# Patient Record
Sex: MALE | Race: ASIAN | NOT HISPANIC OR LATINO | URBAN - METROPOLITAN AREA
[De-identification: names, ages, dates, MRNs, and addresses within clinical notes are randomized per-mention and may not be internally consistent; named-entity substitution may affect disease eponyms.]

---

## 2022-06-10 ENCOUNTER — EMERGENCY (EMERGENCY)
Facility: HOSPITAL | Age: 47
LOS: 0 days | Discharge: HOME | End: 2022-06-11
Attending: EMERGENCY MEDICINE
Payer: MEDICAID

## 2022-06-10 VITALS
WEIGHT: 199.96 LBS | RESPIRATION RATE: 17 BRPM | HEART RATE: 61 BPM | HEIGHT: 70 IN | TEMPERATURE: 98 F | OXYGEN SATURATION: 98 % | SYSTOLIC BLOOD PRESSURE: 124 MMHG | DIASTOLIC BLOOD PRESSURE: 75 MMHG

## 2022-06-10 DIAGNOSIS — S22.32XA FRACTURE OF ONE RIB, LEFT SIDE, INITIAL ENCOUNTER FOR CLOSED FRACTURE: ICD-10-CM
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
## 2022-06-10 DIAGNOSIS — R07.81 PLEURODYNIA: ICD-10-CM

## 2022-06-10 DIAGNOSIS — Z20.822 CONTACT WITH AND (SUSPECTED) EXPOSURE TO COVID-19: ICD-10-CM
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
## 2022-06-10 PROCEDURE — 99284 EMERGENCY DEPT VISIT MOD MDM: CPT

## 2022-06-10 RX ORDER — KETOROLAC TROMETHAMINE 30 MG/ML
15 SYRINGE (ML) INJECTION ONCE
Refills: 0 | Status: DISCONTINUED | OUTPATIENT
Start: 2022-06-10 | End: 2022-06-10

## 2022-06-10 RX ORDER — SODIUM CHLORIDE 9 MG/ML
1000 INJECTION INTRAMUSCULAR; INTRAVENOUS; SUBCUTANEOUS ONCE
Refills: 0 | Status: COMPLETED | OUTPATIENT
Start: 2022-06-10 | End: 2022-06-10

## 2022-06-10 NOTE — ED PROVIDER NOTE - OBJECTIVE STATEMENT
47 year old male came to the ED after a fall. A "couple of hours ago" the pt was running when he tripped and fell landing on his left chest and hit his head. No LOC, no headache, no neck pain, no nausea, no vomiting, he is complaining of left rib pain that is worse with a deep breath. No other complaints, no alcohol or drug abuse.

## 2022-06-10 NOTE — ED PROVIDER NOTE - CLINICAL SUMMARY MEDICAL DECISION MAKING FREE TEXT BOX
Pt with rib injury, will image, medicate and reassess. likely rib fracture, must rule out pneumothorax.

## 2022-06-10 NOTE — ED ADULT TRIAGE NOTE - CHIEF COMPLAINT QUOTE
Pt PW L rib pain a/w pain w/ inhalation and abrasion to top of head. states injured self while running denies LOC.

## 2022-06-10 NOTE — ED PROVIDER NOTE - WORK/EXCUSE FORM DATE
Pt arrives c/o dizziness "like the room is spinning around me" since 5pm, denies headache or vision changes, endorses also having Nausea/ nbnb emesis multiple times w/o abdominal pain or discomfort.  No diarrhea.  No recent ravel/sick contacts.  Denies hx vertigo or signficant PMHx.  EKG done
18-Jun-2022

## 2022-06-10 NOTE — ED PROVIDER NOTE - PATIENT PORTAL LINK FT
You can access the FollowMyHealth Patient Portal offered by [facility] by registering at the following website: [email or web address]. By joining [employer]’s FollowMyHealth portal, you will also be able to view your health information using other applications (apps) compatible with our system.

## 2022-06-11 VITALS
HEART RATE: 70 BPM | TEMPERATURE: 98 F | SYSTOLIC BLOOD PRESSURE: 123 MMHG | RESPIRATION RATE: 18 BRPM | DIASTOLIC BLOOD PRESSURE: 84 MMHG | OXYGEN SATURATION: 98 %

## 2022-06-11 LAB
ALBUMIN SERPL ELPH-MCNC: 4.1 G/DL — SIGNIFICANT CHANGE UP (ref 3.3–5)
ALP SERPL-CCNC: 270 U/L — HIGH (ref 40–120)
ALT FLD-CCNC: 42 U/L — SIGNIFICANT CHANGE UP (ref 12–78)
ANION GAP SERPL CALC-SCNC: 8 MMOL/L — SIGNIFICANT CHANGE UP (ref 5–17)
AST SERPL-CCNC: 37 U/L — SIGNIFICANT CHANGE UP (ref 15–37)
BASOPHILS # BLD AUTO: 0.06 K/UL — SIGNIFICANT CHANGE UP (ref 0–0.2)
BASOPHILS NFR BLD AUTO: 0.5 % — SIGNIFICANT CHANGE UP (ref 0–2)
BILIRUB SERPL-MCNC: 0.5 MG/DL — SIGNIFICANT CHANGE UP (ref 0.2–1.2)
BUN SERPL-MCNC: 21 MG/DL — SIGNIFICANT CHANGE UP (ref 7–23)
CALCIUM SERPL-MCNC: 8.8 MG/DL — SIGNIFICANT CHANGE UP (ref 8.5–10.1)
CHLORIDE SERPL-SCNC: 105 MMOL/L — SIGNIFICANT CHANGE UP (ref 96–108)
CO2 SERPL-SCNC: 25 MMOL/L — SIGNIFICANT CHANGE UP (ref 22–31)
CREAT SERPL-MCNC: 1.22 MG/DL — SIGNIFICANT CHANGE UP (ref 0.5–1.3)
EGFR: 75 ML/MIN/1.73M2 — SIGNIFICANT CHANGE UP
EOSINOPHIL # BLD AUTO: 0.02 K/UL — SIGNIFICANT CHANGE UP (ref 0–0.5)
EOSINOPHIL NFR BLD AUTO: 0.2 % — SIGNIFICANT CHANGE UP (ref 0–6)
FLUAV AG NPH QL: SIGNIFICANT CHANGE UP
FLUBV AG NPH QL: SIGNIFICANT CHANGE UP
GLUCOSE SERPL-MCNC: 143 MG/DL — HIGH (ref 70–99)
HCT VFR BLD CALC: 44.2 % — SIGNIFICANT CHANGE UP (ref 39–50)
HGB BLD-MCNC: 14.9 G/DL — SIGNIFICANT CHANGE UP (ref 13–17)
IMM GRANULOCYTES NFR BLD AUTO: 0.5 % — SIGNIFICANT CHANGE UP (ref 0–1.5)
LYMPHOCYTES # BLD AUTO: 1.06 K/UL — SIGNIFICANT CHANGE UP (ref 1–3.3)
LYMPHOCYTES # BLD AUTO: 8.1 % — LOW (ref 13–44)
MCHC RBC-ENTMCNC: 30.2 PG — SIGNIFICANT CHANGE UP (ref 27–34)
MCHC RBC-ENTMCNC: 33.7 G/DL — SIGNIFICANT CHANGE UP (ref 32–36)
MCV RBC AUTO: 89.5 FL — SIGNIFICANT CHANGE UP (ref 80–100)
MONOCYTES # BLD AUTO: 0.87 K/UL — SIGNIFICANT CHANGE UP (ref 0–0.9)
MONOCYTES NFR BLD AUTO: 6.6 % — SIGNIFICANT CHANGE UP (ref 2–14)
NEUTROPHILS # BLD AUTO: 11.06 K/UL — HIGH (ref 1.8–7.4)
NEUTROPHILS NFR BLD AUTO: 84.1 % — HIGH (ref 43–77)
NRBC # BLD: 0 /100 WBCS — SIGNIFICANT CHANGE UP (ref 0–0)
PLATELET # BLD AUTO: 245 K/UL — SIGNIFICANT CHANGE UP (ref 150–400)
POTASSIUM SERPL-MCNC: 3.6 MMOL/L — SIGNIFICANT CHANGE UP (ref 3.5–5.3)
POTASSIUM SERPL-SCNC: 3.6 MMOL/L — SIGNIFICANT CHANGE UP (ref 3.5–5.3)
PROT SERPL-MCNC: 7.7 GM/DL — SIGNIFICANT CHANGE UP (ref 6–8.3)
RBC # BLD: 4.94 M/UL — SIGNIFICANT CHANGE UP (ref 4.2–5.8)
RBC # FLD: 13.3 % — SIGNIFICANT CHANGE UP (ref 10.3–14.5)
SARS-COV-2 RNA SPEC QL NAA+PROBE: SIGNIFICANT CHANGE UP
SODIUM SERPL-SCNC: 138 MMOL/L — SIGNIFICANT CHANGE UP (ref 135–145)
WBC # BLD: 13.13 K/UL — HIGH (ref 3.8–10.5)
WBC # FLD AUTO: 13.13 K/UL — HIGH (ref 3.8–10.5)

## 2022-06-11 PROCEDURE — 71250 CT THORAX DX C-: CPT | Mod: 26,MA

## 2022-06-11 PROCEDURE — 71045 X-RAY EXAM CHEST 1 VIEW: CPT | Mod: 26

## 2022-06-11 RX ORDER — TRAMADOL HYDROCHLORIDE 50 MG/1
50 TABLET ORAL ONCE
Refills: 0 | Status: DISCONTINUED | OUTPATIENT
Start: 2022-06-11 | End: 2022-06-11

## 2022-06-11 RX ORDER — TRAMADOL HYDROCHLORIDE 50 MG/1
1 TABLET ORAL
Qty: 18 | Refills: 0
Start: 2022-06-11 | End: 2022-06-13

## 2022-06-11 RX ADMIN — TRAMADOL HYDROCHLORIDE 50 MILLIGRAM(S): 50 TABLET ORAL at 05:01

## 2022-06-11 RX ADMIN — SODIUM CHLORIDE 2000 MILLILITER(S): 9 INJECTION INTRAMUSCULAR; INTRAVENOUS; SUBCUTANEOUS at 00:44

## 2022-06-11 RX ADMIN — Medication 15 MILLIGRAM(S): at 00:45

## 2022-06-11 RX ADMIN — Medication 15 MILLIGRAM(S): at 01:56

## 2023-09-06 ENCOUNTER — EMERGENCY (EMERGENCY)
Facility: HOSPITAL | Age: 48
LOS: 1 days | Discharge: HOME | End: 2023-09-06
Admitting: EMERGENCY MEDICINE
Payer: MEDICAID

## 2023-09-06 VITALS
RESPIRATION RATE: 16 BRPM | TEMPERATURE: 98 F | HEART RATE: 85 BPM | SYSTOLIC BLOOD PRESSURE: 111 MMHG | OXYGEN SATURATION: 100 % | DIASTOLIC BLOOD PRESSURE: 80 MMHG

## 2023-09-06 VITALS
HEART RATE: 82 BPM | DIASTOLIC BLOOD PRESSURE: 95 MMHG | OXYGEN SATURATION: 100 % | RESPIRATION RATE: 16 BRPM | SYSTOLIC BLOOD PRESSURE: 119 MMHG | TEMPERATURE: 98 F

## 2023-09-06 PROBLEM — Z78.9 OTHER SPECIFIED HEALTH STATUS: Chronic | Status: ACTIVE | Noted: 2022-06-10

## 2023-09-06 PROCEDURE — 12002 RPR S/N/AX/GEN/TRNK2.6-7.5CM: CPT

## 2023-09-06 PROCEDURE — 73140 X-RAY EXAM OF FINGER(S): CPT | Mod: 26,RT

## 2023-09-06 PROCEDURE — 99284 EMERGENCY DEPT VISIT MOD MDM: CPT | Mod: 25

## 2023-09-06 PROCEDURE — 99053 MED SERV 10PM-8AM 24 HR FAC: CPT

## 2023-09-06 RX ORDER — TETANUS TOXOID, REDUCED DIPHTHERIA TOXOID AND ACELLULAR PERTUSSIS VACCINE, ADSORBED 5; 2.5; 8; 8; 2.5 [IU]/.5ML; [IU]/.5ML; UG/.5ML; UG/.5ML; UG/.5ML
0.5 SUSPENSION INTRAMUSCULAR ONCE
Refills: 0 | Status: COMPLETED | OUTPATIENT
Start: 2023-09-06 | End: 2023-09-06

## 2023-09-06 RX ADMIN — TETANUS TOXOID, REDUCED DIPHTHERIA TOXOID AND ACELLULAR PERTUSSIS VACCINE, ADSORBED 0.5 MILLILITER(S): 5; 2.5; 8; 8; 2.5 SUSPENSION INTRAMUSCULAR at 06:14

## 2023-09-06 NOTE — ED PROVIDER NOTE - CLINICAL SUMMARY MEDICAL DECISION MAKING FREE TEXT BOX
48yoM no PMH p/w R 2nd digit laceration. Pt states he was hopping over a metal fence and his finger got stuck. no blood thinner use. no numbness or tingling. L hand dominant. last tdap unknown.  lac repair  finger splint  f/u in 10-14 days for suture removal  tdap given

## 2023-09-06 NOTE — ED PROVIDER NOTE - PHYSICAL EXAMINATION
CONSTITUTIONAL: Well-appearing; well-nourished; in no apparent distress;  NECK/LYMPH: Supple; non-tender;  CARD: Normal S1, S2; no murmurs, rubs, or gallops noted  RESP: Normal chest excursion with respiration; breath sounds clear and equal bilaterally; no wheezes, rhonchi, or rales noted  EXT/MS: moves all extremities; distal pulses are normal, no pedal edema. 5/5 strenght flexion and extension of R 2nd digit. sensation intact. pulses intact.   SKIN: Normal for age and race; warm; dry; good turgor; +2cm jagged irregular shaped laceration noted to volar aspect of 2nd digit overlying PIP. and 1cm jagged irregular shaped laceration noted to ulnar aspect of R 2nd digit.   NEURO: Awake, alert, oriented x 3, no gross deficits, CN II-XII grossly intact, no motor or sensory deficit noted  PSYCH: Normal mood; appropriate affect

## 2023-09-06 NOTE — ED ADULT NURSE NOTE - NSFALLUNIVINTERV_ED_ALL_ED
Bed/Stretcher in lowest position, wheels locked, appropriate side rails in place/Call bell, personal items and telephone in reach/Instruct patient to call for assistance before getting out of bed/chair/stretcher/Non-slip footwear applied when patient is off stretcher/[place] to call system/Physically safe environment - no spills, clutter or unnecessary equipment/Purposeful proactive rounding/Room/bathroom lighting operational, light cord in reach

## 2023-09-06 NOTE — ED ADULT NURSE NOTE - OBJECTIVE STATEMENT
Patient received in ED intake room 6. A&Ox4 and ambulatory. C/o right hand, 2nd digit laceration sustained from a chain link fence. Dried blood noted, no active bleed. NIKI Vincent at bedside at this time performing lac repair. Respirations even and unlabored. Speaking in full and complete sentences. No acute distress noted. Family at bedside. Bed in lowest position, call bell in reach, wheels locked, safety maintained. Awaiting further orders.

## 2023-09-06 NOTE — ED PROVIDER NOTE - NSFOLLOWUPINSTRUCTIONS_ED_ALL_ED_FT
FOLLOW ALL INSTRUCTIONS GIVEN YOU ABOUT THE CARE OF YOUR LACERATION    RETURN FOR SUTURE REMOVAL IN 10-14 DAYS  REMAIN WEARING FINGER SPLINT FOR AT LEAST 10 DAYS.    Laceration WHAT YOU NEED TO KNOW:    A laceration is an injury to the skin and the soft tissue underneath it. Lacerations can happen anywhere on the body.    DISCHARGE INSTRUCTIONS:    Seek care immediately if:   •You have heavy bleeding or bleeding that does not stop after 10 minutes of holding firm, direct pressure over the wound.      •Your wound opens up.      Call your doctor if:   •You have a fever or chills.      •Your laceration is red, warm, or swollen.      •You have red streaks on your skin coming from your wound.      •You have white or yellow drainage from the wound that smells bad.      •You have pain that gets worse, even after treatment.      •You have questions or concerns about your condition or care.      Medicines: You may need any of the following:   •Prescription pain medicine may be given. Ask your healthcare provider how to take this medicine safely. Some prescription pain medicines contain acetaminophen. Do not take other medicines that contain acetaminophen without talking to your healthcare provider. Too much acetaminophen may cause liver damage. Prescription pain medicine may cause constipation. Ask your healthcare provider how to prevent or treat constipation.       •Antibiotics help treat or prevent a bacterial infection.      •Take your medicine as directed. Contact your healthcare provider if you think your medicine is not helping or if you have side effects. Tell him or her if you are allergic to any medicine. Keep a list of the medicines, vitamins, and herbs you take. Include the amounts, and when and why you take them. Bring the list or the pill bottles to follow-up visits. Carry your medicine list with you in case of an emergency.      Care for your wound as directed:   •Do not get your wound wet until your healthcare provider says it is okay. Do not soak your wound in water. Do not go swimming until your healthcare provider says it is okay. Carefully wash the wound with soap and water. Gently pat the area dry or allow it to air dry.      •Change your bandages when they get wet, dirty, or after washing. Apply new, clean bandages as directed. Do not apply elastic bandages or tape too tight. Do not put powders or lotions over your incision.      •Apply antibiotic ointment as directed. Your healthcare provider may give you antibiotic ointment to put over your wound if you have stitches. If you have strips of tape over your incision, let them dry up and fall off on their own. If they do not fall off within 14 days, gently remove them. If you have glue over your wound, do not remove or pick at it. If your glue comes off, do not replace it with glue that you have at home.        •Check your wound every day for signs of infection, such as swelling, redness, or pus.      Self-care:   •Apply ice on your wound for 15 to 20 minutes every hour or as directed. Use an ice pack, or put crushed ice in a plastic bag. Cover it with a towel. Ice helps prevent tissue damage and decreases swelling and pain.      •Use a splint as directed. A splint will decrease movement and stress on your wound. It may help it heal faster. A splint may be used for lacerations over joints or areas of your body that bend. Ask your healthcare provider how to apply and remove a splint.      •Decrease scarring of your wound by applying ointments as directed. Do not apply ointments until your healthcare provider says it is okay. You may need to wait until your wound is healed. Ask which ointment to buy and how often to use it. After your wound is healed, use sunscreen over the area when you are out in the sun. You should do this for at least 6 months to 1 year after your injury.      Follow up with your doctor as directed: You will need to return in 3 to 14 days to have stitches or staples removed. Write down your questions so you remember to ask them during your visits.      Laceración    LO QUE NECESITA SABER:    Regreso al departamento de emergencias en      días para retiro de suturas/grapas    Rhea laceración es rhea herida que se presenta en la piel y en el tejido blando que hay debajo de alfonso. Las laceraciones pueden presentarse en cualquier parte del cuerpo.    INSTRUCCIONES SOBRE EL ESTRELLITA HOSPITALARIA:    Busque atención médica de inmediato si:  •Está sangrando mucho o tiene sangrado que no para después de 10 minutos de aplicar presión firme y directa sobre la herida.      •Frank herida se abre.      Llame a frank médico si:  •Usted tiene fiebre o escalofríos.      •Frank laceración está enrojecida, tibia o inflamada.      •En la piel de frank herida le salen unas troy basilio.      •Usted tiene drenaje cao o amarillo saliendo de la herida que tiene mal olor.      •Usted tiene dolor que está empeorando después del tratamiento.      •Usted tiene preguntas o inquietudes acerca de frank condición o cuidado.      Medicamentos:Es posible que usted necesite alguno de los siguientes:   •Puede administrarsepodrían administrarse. Pregunte al médico cómo debe christopher maynor medicamento de forma mcbride. Algunos medicamentos recetados para el dolor contienen acetaminofén. No tome otros medicamentos que contengan acetaminofén sin consultarlo con frank médico. Demasiado acetaminofeno puede causar daño al hígado. Los medicamentos recetados para el dolor podrían causar estreñimiento. Pregunte a frank médico yue prevenir o tratar estreñimiento.      •Los antibióticosayudan a tratar o prevenir infecciones bacteriales.      •[place] vini medicamentos yue se le haya indicado.Consulte con frank médico si usted kwaku que frank medicamento no le está ayudando o si presenta efectos secundarios. Infórmele si es alérgico a cualquier medicamento. Mantenga rhea lista actualizada de los medicamentos, las vitaminas y los productos herbales que thuy. Incluya los siguientes datos de los medicamentos: cantidad, frecuencia y motivo de administración. Traiga con usted la lista o los envases de las píldoras a vini citas de seguimiento. Lleve la lista de los medicamentos con usted en lui de rhea emergencia.      Siga las instrucciones de frank médico sobre el cuidado de vini heridas:  •No moje la herida.hasta que frank médico lo autorice. No sumerja frank herida en agua. No vaya a nadar hasta que frank médico lo autorice. Lave cuidadosamente la herida con agua y jabón. Seque el área con palmadas suaves o permita que se seque al aire.      •[place] vini vendascuando se mojen, estén sucios o después del lavado. Aplique un vendaje limpio según las indicaciones. No se aplique un vendaje elástico ni cinta muy apretada. No se aplique polvos ni rhea loción en frank incisión.      •Aplique un ungüento antibiótico yue se indica.Frank médico le puede formular un ungüento antibiótico para que se aplique sobre frank herida en lui que tenga puntos de sutura. En lui de tener cintas o tiras sobre frank incisión, permita que se sequen y se caigan solas. En lui que no se caigan en 14 días, retírelas con cuidado. Si usted tiene pegamento sobre frank herida, no lo retire ni se lo moleste. Si el pegamento se , no lo reemplace con pegamento casero.      •Revise frank herida todos los días para detectar signos de infección,yue hinchazón, enrojecimiento o pus.      Cuidados personales:  •Aplique hieloen la herida de 15 a 20 minutos cada hora o yue se le indique. Use rhea compresa de hielo o ponga hielo triturado en rhea bolsa de plástico. Cúbrala con rhea toalla. El hielo ayuda a evitar daño al tejido y a disminuir la inflamación y el dolor.      •Use rhea férula según las indicaciones.Rhea férula lo inmovilizará y disminuirá la tensión sobre la herida. Es posible que le sirva para recuperarse más rápido. Rhea férula se puede usar para rhea laceración sobre las articulaciones o las áreas de frank cuerpo que se doblan. Pregunte a frank médico cómo se debe colocar y retirar frank férula.      •Disminuya la cicatrización de frank heridaaplicando un ungüento o pomada según las indicaciones. No se aplique pomadas en la herida hasta que frank médico se lo indique. Es posible que necesite esperar hasta que la herida sane. Pregunte cuál pomada debe comprar y la frecuencia con que debe usarla. Después de que la herida sane, use un bloqueador de sol sobre el área cuando se encuentre expuesta al sol. Usted debe hacer esto marita al menos 6 meses hasta 1 año después de yanci sufrido la lesión.      Acuda a la consulta de control con frank médico según las indicaciones:Usted tendrá que regresar en un lapso de 3 a 14 días para que le quiten los puntos de sutura o grapas. Anote vini preguntas para que se acuerde de hacerlas marita vini visitas.

## 2023-09-06 NOTE — ED PROVIDER NOTE - PATIENT PORTAL LINK FT
You can access the FollowMyHealth Patient Portal offered by [facility] by registering at the following website: [email or web address]. By joining [email or web address]’s FollowMyHealth portal, you will also be able to view your health information using other applications (apps) compatible with our system.

## 2023-09-06 NOTE — ED PROVIDER NOTE - OBJECTIVE STATEMENT
48yoM no PMH p/w R 2nd digit laceration. P 48yoM no PMH p/w R 2nd digit laceration. Pt states he was hopping over a metal fence and his finger got stuck. no blood thinner use. no numbness or tingling. L hand dominant. last tdap unknown.

## 2023-09-06 NOTE — ED ADULT TRIAGE NOTE - CHIEF COMPLAINT QUOTE
pt c/o laceration to right 2nd and 3rd fingers sustained from a chain link fence. last tetanus unknown. arrives with fingers wrapped in paper towel. unable to assess lacerations due to dried blood. no large cuts or active bleeding noted. new dressing applied. denies Phx.

## 2023-12-07 NOTE — ED ADULT TRIAGE NOTE - BSA (M2)
Pre-Surgery Instructions:   Medication Instructions    atorvastatin (LIPITOR) 20 mg tablet Take night before surgery    citalopram (CeleXA) 10 mg tablet Take day of surgery. cyclobenzaprine (FLEXERIL) 10 mg tablet Uses PRN- OK to take day of surgery    diclofenac (VOLTAREN) 75 mg EC tablet Stop taking 3 days prior to surgery. oxybutynin (DITROPAN) 5 mg tablet Hold day of surgery. tadalafil (CIALIS) 20 MG tablet Uses PRN- DO NOT take day of surgery    tamsulosin (FLOMAX) 0.4 mg Take night before surgery   Medication instructions for day surgery reviewed. Please use only a sip of water to take your instructed medications. Avoid all over the counter vitamins, supplements and NSAIDS for one week prior to surgery per anesthesia guidelines. Tylenol is ok to take as needed. You will receive a call one business day prior to surgery with an arrival time and hospital directions. If your surgery is scheduled on a Monday, the hospital will be calling you on the Friday prior to your surgery. If you have not heard from anyone by 8pm, please call the hospital supervisor through the hospital  at [phone]. Verónica Madrigal [phone]). Do not eat or drink anything after midnight the night before your surgery, including candy, mints, lifesavers, or chewing gum. Do not drink alcohol 24hrs before your surgery. Try not to smoke at least 24hrs before your surgery. Follow the pre surgery showering instructions as listed in the [facility] Surgical Experience Booklet” or otherwise provided by your surgeon's office. Do not use a blade to shave the surgical area 1 week before surgery. It is okay to use a clean electric clippers up to 24 hours before surgery. Do not apply any lotions, creams, including makeup, cologne, deodorant, or perfumes after showering on the day of your surgery. Do not use dry shampoo, hair spray, hair gel, or any type of hair products. No contact lenses, eye make-up, or artificial eyelashes. Remove nail polish, including gel polish, and any artificial, gel, or acrylic nails if possible. Remove all jewelry including rings and body piercing jewelry. Wear causal clothing that is easy to take on and off. Consider your type of surgery. Keep any valuables, jewelry, piercings at home. Please bring any specially ordered equipment (sling, braces) if indicated. Arrange for a responsible person to drive you to and from the hospital on the day of your surgery. Visitor Guidelines discussed. Call the surgeon's office with any new illnesses, exposures, or additional questions prior to surgery. Please reference your [facility] Surgical Experience Booklet” for additional information to prepare for your upcoming surgery. 2.09